# Patient Record
Sex: FEMALE | Race: OTHER | NOT HISPANIC OR LATINO
[De-identification: names, ages, dates, MRNs, and addresses within clinical notes are randomized per-mention and may not be internally consistent; named-entity substitution may affect disease eponyms.]

---

## 2017-06-14 ENCOUNTER — TRANSCRIPTION ENCOUNTER (OUTPATIENT)
Age: 42
End: 2017-06-14

## 2017-11-06 ENCOUNTER — TRANSCRIPTION ENCOUNTER (OUTPATIENT)
Age: 42
End: 2017-11-06

## 2018-05-01 ENCOUNTER — TRANSCRIPTION ENCOUNTER (OUTPATIENT)
Age: 43
End: 2018-05-01

## 2018-09-21 ENCOUNTER — TRANSCRIPTION ENCOUNTER (OUTPATIENT)
Age: 43
End: 2018-09-21

## 2018-10-02 ENCOUNTER — APPOINTMENT (OUTPATIENT)
Dept: OBGYN | Facility: CLINIC | Age: 43
End: 2018-10-02

## 2018-12-28 ENCOUNTER — RESULT REVIEW (OUTPATIENT)
Age: 43
End: 2018-12-28

## 2019-05-21 ENCOUNTER — TRANSCRIPTION ENCOUNTER (OUTPATIENT)
Age: 44
End: 2019-05-21

## 2019-05-23 ENCOUNTER — APPOINTMENT (OUTPATIENT)
Dept: FAMILY MEDICINE | Facility: CLINIC | Age: 44
End: 2019-05-23

## 2019-05-24 ENCOUNTER — APPOINTMENT (OUTPATIENT)
Dept: FAMILY MEDICINE | Facility: CLINIC | Age: 44
End: 2019-05-24
Payer: COMMERCIAL

## 2019-05-24 VITALS — TEMPERATURE: 97.7 F

## 2019-05-24 VITALS — DIASTOLIC BLOOD PRESSURE: 70 MMHG | SYSTOLIC BLOOD PRESSURE: 110 MMHG

## 2019-05-24 DIAGNOSIS — Z87.09 PERSONAL HISTORY OF OTHER DISEASES OF THE RESPIRATORY SYSTEM: ICD-10-CM

## 2019-05-24 DIAGNOSIS — R05 COUGH: ICD-10-CM

## 2019-05-24 PROCEDURE — 99202 OFFICE O/P NEW SF 15 MIN: CPT

## 2019-05-24 NOTE — REVIEW OF SYSTEMS
[Nasal Discharge] : nasal discharge [Sore Throat] : sore throat [Postnasal Drip] : postnasal drip [Headache] : headache [Cough] : cough [Negative] : Heme/Lymph [Fever] : no fever [Chills] : no chills [Chest Pain] : no chest pain [Hoarseness] : no hoarseness [Earache] : no earache [Palpitations] : no palpitations [Wheezing] : no wheezing [Shortness Of Breath] : no shortness of breath

## 2019-05-24 NOTE — HISTORY OF PRESENT ILLNESS
[FreeTextEntry8] : 43y/o with no significant PMHx presents to the office with c/o congestion and cough, with yellow PND. Additionally, c/o   sinus HA. H/o of sinus infections twice yearly. 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 No known seasonal allergies. \par \par \par \par

## 2019-05-24 NOTE — PHYSICAL EXAM
[Normal] : normal rate, regular rhythm, normal S1/S2, no murmur [de-identified] : clear watery PND  [de-identified] : AA&Ox3

## 2019-05-24 NOTE — PLAN
[FreeTextEntry1] : URI symptoms / Sinusitis\par  \par - Augmentin BID x5-7 days \par - Recommended Sudafed \par - Netti pot\par - Continue Flonase \par - Put head over hot pot\par - Steam up shower \par \par \par

## 2019-05-24 NOTE — ADDENDUM
[FreeTextEntry1] : I, Coltonmiguel Garciacandie, acted solely as a scribe for Dr. Daily on this date 05/24/2019.\par \par All medical record entries made by the Scribe were at my, Dr. Daily, direction and personally dictated by me on 05/24/2019. I have reviewed the chart and agree that the record accurately reflects my personal performance of the history, physical exam, assessment and plan.  I have also personally directed, reviewed and agreed with the chart.

## 2019-10-21 ENCOUNTER — TRANSCRIPTION ENCOUNTER (OUTPATIENT)
Age: 44
End: 2019-10-21

## 2021-01-26 ENCOUNTER — RESULT REVIEW (OUTPATIENT)
Age: 46
End: 2021-01-26

## 2021-06-24 ENCOUNTER — TRANSCRIPTION ENCOUNTER (OUTPATIENT)
Age: 46
End: 2021-06-24

## 2022-02-08 ENCOUNTER — RESULT REVIEW (OUTPATIENT)
Age: 47
End: 2022-02-08

## 2022-03-03 ENCOUNTER — NON-APPOINTMENT (OUTPATIENT)
Age: 47
End: 2022-03-03

## 2022-03-03 ENCOUNTER — APPOINTMENT (OUTPATIENT)
Dept: FAMILY MEDICINE | Facility: CLINIC | Age: 47
End: 2022-03-03
Payer: COMMERCIAL

## 2022-03-03 VITALS — SYSTOLIC BLOOD PRESSURE: 160 MMHG | DIASTOLIC BLOOD PRESSURE: 110 MMHG

## 2022-03-03 VITALS — TEMPERATURE: 96.9 F | DIASTOLIC BLOOD PRESSURE: 100 MMHG | SYSTOLIC BLOOD PRESSURE: 148 MMHG

## 2022-03-03 DIAGNOSIS — R00.2 PALPITATIONS: ICD-10-CM

## 2022-03-03 PROCEDURE — 93000 ELECTROCARDIOGRAM COMPLETE: CPT

## 2022-03-03 PROCEDURE — 99214 OFFICE O/P EST MOD 30 MIN: CPT | Mod: 25

## 2022-03-03 RX ORDER — VALSARTAN 80 MG/1
80 TABLET, COATED ORAL
Qty: 30 | Refills: 5 | Status: ACTIVE | COMMUNITY
Start: 2022-03-03 | End: 1900-01-01

## 2022-03-03 NOTE — HISTORY OF PRESENT ILLNESS
[FreeTextEntry8] : 48 y/o F without significant PMHx presents to office for BP check.Pt was at OBGYN and BP elevated 140/110. Has HA no dizziness. Never had HTN previously. No FH . Recently noticed heart "fluttering" and some palpitations.Does admit to increased stress and anxiety for thepast 6 months (daughter has GI Issues)

## 2022-03-03 NOTE — REVIEW OF SYSTEMS
[Chest Pain] : chest pain [Palpitations] : palpitations [Anxiety] : anxiety [Negative] : Respiratory

## 2022-03-24 ENCOUNTER — APPOINTMENT (OUTPATIENT)
Dept: FAMILY MEDICINE | Facility: CLINIC | Age: 47
End: 2022-03-24
Payer: COMMERCIAL

## 2022-03-24 VITALS — SYSTOLIC BLOOD PRESSURE: 126 MMHG | DIASTOLIC BLOOD PRESSURE: 82 MMHG | TEMPERATURE: 96.6 F

## 2022-03-24 DIAGNOSIS — Z72.89 OTHER PROBLEMS RELATED TO LIFESTYLE: ICD-10-CM

## 2022-03-24 PROCEDURE — 99213 OFFICE O/P EST LOW 20 MIN: CPT

## 2022-03-24 NOTE — HISTORY OF PRESENT ILLNESS
[de-identified] : 48 y/o F  PMHx HTN (Valsartan) presents to office for BP check. Started Valsartan 3 weeks ago. Feels well. Also stopped drinking wine 1 wek ago. Admits to 1-2 glasses nightly

## 2022-05-02 ENCOUNTER — APPOINTMENT (OUTPATIENT)
Dept: FAMILY MEDICINE | Facility: CLINIC | Age: 47
End: 2022-05-02
Payer: COMMERCIAL

## 2022-05-02 VITALS — WEIGHT: 112 LBS

## 2022-05-02 VITALS — SYSTOLIC BLOOD PRESSURE: 122 MMHG | TEMPERATURE: 94.9 F | DIASTOLIC BLOOD PRESSURE: 80 MMHG

## 2022-05-02 DIAGNOSIS — R63.5 ABNORMAL WEIGHT GAIN: ICD-10-CM

## 2022-05-02 DIAGNOSIS — Z00.00 ENCOUNTER FOR GENERAL ADULT MEDICAL EXAMINATION W/OUT ABNORMAL FINDINGS: ICD-10-CM

## 2022-05-02 PROCEDURE — 99214 OFFICE O/P EST MOD 30 MIN: CPT

## 2022-05-02 NOTE — REVIEW OF SYSTEMS
[Recent Change In Weight] : ~T recent weight change [Anxiety] : anxiety [Depression] : depression [Negative] : Respiratory

## 2022-05-02 NOTE — HISTORY OF PRESENT ILLNESS
[FreeTextEntry8] : 46 y/o F  PMHx HTN (Valsartan) presents to office for increased fatigue and weight gain. Stopped BCP few months ago,. Gained 8 pounds in few 2 months.  Recent labs per GYN told patient shes "in perimenopause".  Admits to drinking less wine nightly (not every night).Menses irregular off  BCP. Takes CBD for insomnia. \par Feels exhausted all day". Feels a lot of anxiety with family issues. Feels overwhelemed

## 2022-05-17 ENCOUNTER — APPOINTMENT (OUTPATIENT)
Dept: FAMILY MEDICINE | Facility: CLINIC | Age: 47
End: 2022-05-17
Payer: COMMERCIAL

## 2022-05-17 DIAGNOSIS — I10 ESSENTIAL (PRIMARY) HYPERTENSION: ICD-10-CM

## 2022-05-17 PROCEDURE — 99213 OFFICE O/P EST LOW 20 MIN: CPT | Mod: 95

## 2022-05-17 NOTE — HISTORY OF PRESENT ILLNESS
[Home] : at home, [unfilled] , at the time of the visit. [Medical Office: ([facility])___] : at the medical office located in  [Verbal consent obtained from patient] : the patient, [unfilled] [de-identified] : 48 y/o F  PMHx HTN (Valsartan) presents via telehealth for lab review on update.Pt stai;l feeling very fatigued.

## 2022-05-27 ENCOUNTER — NON-APPOINTMENT (OUTPATIENT)
Age: 47
End: 2022-05-27

## 2022-10-07 ENCOUNTER — NON-APPOINTMENT (OUTPATIENT)
Age: 47
End: 2022-10-07

## 2023-02-03 ENCOUNTER — NON-APPOINTMENT (OUTPATIENT)
Age: 48
End: 2023-02-03

## 2023-03-20 ENCOUNTER — NON-APPOINTMENT (OUTPATIENT)
Age: 48
End: 2023-03-20

## 2023-03-22 ENCOUNTER — NON-APPOINTMENT (OUTPATIENT)
Age: 48
End: 2023-03-22

## 2023-04-01 ENCOUNTER — NON-APPOINTMENT (OUTPATIENT)
Age: 48
End: 2023-04-01

## 2023-06-06 ENCOUNTER — APPOINTMENT (OUTPATIENT)
Dept: FAMILY MEDICINE | Facility: CLINIC | Age: 48
End: 2023-06-06
Payer: COMMERCIAL

## 2023-06-06 VITALS — SYSTOLIC BLOOD PRESSURE: 138 MMHG | DIASTOLIC BLOOD PRESSURE: 82 MMHG

## 2023-06-06 VITALS
RESPIRATION RATE: 16 BRPM | OXYGEN SATURATION: 96 % | DIASTOLIC BLOOD PRESSURE: 80 MMHG | SYSTOLIC BLOOD PRESSURE: 140 MMHG | HEART RATE: 87 BPM | TEMPERATURE: 96.8 F

## 2023-06-06 DIAGNOSIS — N95.1 MENOPAUSAL AND FEMALE CLIMACTERIC STATES: ICD-10-CM

## 2023-06-06 DIAGNOSIS — R53.83 OTHER FATIGUE: ICD-10-CM

## 2023-06-06 DIAGNOSIS — E83.19 OTHER DISORDERS OF IRON METABOLISM: ICD-10-CM

## 2023-06-06 PROCEDURE — 99215 OFFICE O/P EST HI 40 MIN: CPT

## 2023-06-06 NOTE — HISTORY OF PRESENT ILLNESS
[FreeTextEntry8] : 47 y/o F presents with elevated BP> Pt had elevated BP last year GYN stopped BCP. BP got better. Restarted BCP 6 weeks ago BP elevated. Stopped BCP last week however still elevated. BP readings 148/98, 149/118,133/92.  Father +. Had some lightheadedness. Ha tyshawn Covid 4 weeks ago. Stopped  Caffeine and wine.HTN . Under stress at home feels anxious. Doesnt feel depressed.

## 2023-06-06 NOTE — REVIEW OF SYSTEMS
[Fatigue] : fatigue [Negative] : Cardiovascular [Dizziness] : dizziness [Anxiety] : anxiety [FreeTextEntry6] : heavy breathing [de-identified] : lightheaded intremittently

## 2023-07-02 LAB
CHOLEST SERPL-MCNC: 251 MG/DL
FERRITIN SERPL-MCNC: 27 NG/ML
HDLC SERPL-MCNC: 78 MG/DL
IRON SERPL-MCNC: 113 UG/DL
LDLC SERPL CALC-MCNC: 141 MG/DL
NONHDLC SERPL-MCNC: 173 MG/DL
TRIGL SERPL-MCNC: 162 MG/DL
TSH SERPL-ACNC: 2.28 UIU/ML

## 2023-07-06 ENCOUNTER — APPOINTMENT (OUTPATIENT)
Dept: FAMILY MEDICINE | Facility: CLINIC | Age: 48
End: 2023-07-06
Payer: COMMERCIAL

## 2023-07-06 VITALS
SYSTOLIC BLOOD PRESSURE: 125 MMHG | DIASTOLIC BLOOD PRESSURE: 85 MMHG | OXYGEN SATURATION: 97 % | TEMPERATURE: 97.5 F | RESPIRATION RATE: 16 BRPM | HEART RATE: 82 BPM

## 2023-07-06 DIAGNOSIS — F41.9 ANXIETY DISORDER, UNSPECIFIED: ICD-10-CM

## 2023-07-06 DIAGNOSIS — R03.0 ELEVATED BLOOD-PRESSURE READING, W/OUT DIAGNOSIS OF HYPERTENSION: ICD-10-CM

## 2023-07-06 PROCEDURE — 99214 OFFICE O/P EST MOD 30 MIN: CPT

## 2023-07-06 NOTE — HISTORY OF PRESENT ILLNESS
[de-identified] : 47 y/o F presents with BP readings.  121/80,153/98,144/102. Pt states that when her anxiety is worse her BP is elevated. Here to review l abs

## 2023-10-16 ENCOUNTER — NON-APPOINTMENT (OUTPATIENT)
Age: 48
End: 2023-10-16

## 2023-10-24 ENCOUNTER — RESULT REVIEW (OUTPATIENT)
Age: 48
End: 2023-10-24

## 2023-10-24 ENCOUNTER — APPOINTMENT (OUTPATIENT)
Dept: RADIOLOGY | Facility: IMAGING CENTER | Age: 48
End: 2023-10-24
Payer: COMMERCIAL

## 2023-10-24 ENCOUNTER — OUTPATIENT (OUTPATIENT)
Dept: OUTPATIENT SERVICES | Facility: HOSPITAL | Age: 48
LOS: 1 days | End: 2023-10-24
Payer: COMMERCIAL

## 2023-10-24 ENCOUNTER — APPOINTMENT (OUTPATIENT)
Dept: FAMILY MEDICINE | Facility: CLINIC | Age: 48
End: 2023-10-24
Payer: COMMERCIAL

## 2023-10-24 VITALS
HEART RATE: 98 BPM | OXYGEN SATURATION: 99 % | DIASTOLIC BLOOD PRESSURE: 80 MMHG | TEMPERATURE: 97.5 F | RESPIRATION RATE: 16 BRPM | SYSTOLIC BLOOD PRESSURE: 120 MMHG

## 2023-10-24 DIAGNOSIS — M54.6 PAIN IN THORACIC SPINE: ICD-10-CM

## 2023-10-24 DIAGNOSIS — G89.29 LOW BACK PAIN, UNSPECIFIED: ICD-10-CM

## 2023-10-24 DIAGNOSIS — M54.50 LOW BACK PAIN, UNSPECIFIED: ICD-10-CM

## 2023-10-24 PROCEDURE — 99214 OFFICE O/P EST MOD 30 MIN: CPT

## 2023-10-24 PROCEDURE — 72072 X-RAY EXAM THORAC SPINE 3VWS: CPT | Mod: 26

## 2023-10-24 PROCEDURE — 72100 X-RAY EXAM L-S SPINE 2/3 VWS: CPT

## 2023-10-24 PROCEDURE — 72072 X-RAY EXAM THORAC SPINE 3VWS: CPT

## 2023-10-24 PROCEDURE — 72100 X-RAY EXAM L-S SPINE 2/3 VWS: CPT | Mod: 26

## 2023-10-24 RX ORDER — METHYLPREDNISOLONE 4 MG/1
4 TABLET ORAL
Qty: 1 | Refills: 0 | Status: ACTIVE | COMMUNITY
Start: 2023-10-24 | End: 1900-01-01

## 2023-10-24 RX ORDER — NAPROXEN 500 MG/1
500 TABLET ORAL
Qty: 20 | Refills: 0 | Status: ACTIVE | COMMUNITY
Start: 2023-10-17

## 2023-10-24 RX ORDER — AMOXICILLIN AND CLAVULANATE POTASSIUM 875; 125 MG/1; MG/1
875-125 TABLET, COATED ORAL TWICE DAILY
Qty: 14 | Refills: 0 | Status: COMPLETED | COMMUNITY
Start: 2019-05-24 | End: 2023-10-24

## 2023-10-24 RX ORDER — CYCLOBENZAPRINE HYDROCHLORIDE 10 MG/1
10 TABLET, FILM COATED ORAL
Qty: 21 | Refills: 0 | Status: ACTIVE | COMMUNITY
Start: 2023-10-17

## 2023-11-02 DIAGNOSIS — E78.5 HYPERLIPIDEMIA, UNSPECIFIED: ICD-10-CM

## 2023-11-06 ENCOUNTER — NON-APPOINTMENT (OUTPATIENT)
Age: 48
End: 2023-11-06

## 2023-11-06 ENCOUNTER — APPOINTMENT (OUTPATIENT)
Dept: PHYSICAL MEDICINE AND REHAB | Facility: CLINIC | Age: 48
End: 2023-11-06
Payer: COMMERCIAL

## 2023-11-06 VITALS — DIASTOLIC BLOOD PRESSURE: 101 MMHG | OXYGEN SATURATION: 98 % | SYSTOLIC BLOOD PRESSURE: 158 MMHG | HEART RATE: 108 BPM

## 2023-11-06 DIAGNOSIS — G89.29 MYALGIA, OTHER SITE: ICD-10-CM

## 2023-11-06 DIAGNOSIS — M79.18 MYALGIA, OTHER SITE: ICD-10-CM

## 2023-11-06 PROCEDURE — 99204 OFFICE O/P NEW MOD 45 MIN: CPT

## 2023-11-06 RX ORDER — TIZANIDINE 2 MG/1
2 TABLET ORAL
Qty: 45 | Refills: 1 | Status: ACTIVE | COMMUNITY
Start: 2023-11-06 | End: 1900-01-01

## 2023-11-06 RX ORDER — MELOXICAM 15 MG/1
15 TABLET ORAL
Qty: 30 | Refills: 1 | Status: ACTIVE | COMMUNITY
Start: 2023-11-06 | End: 1900-01-01

## 2023-11-14 ENCOUNTER — NON-APPOINTMENT (OUTPATIENT)
Age: 48
End: 2023-11-14

## 2023-11-24 ENCOUNTER — APPOINTMENT (OUTPATIENT)
Dept: MRI IMAGING | Facility: CLINIC | Age: 48
End: 2023-11-24
Payer: COMMERCIAL

## 2023-11-24 ENCOUNTER — OUTPATIENT (OUTPATIENT)
Dept: OUTPATIENT SERVICES | Facility: HOSPITAL | Age: 48
LOS: 1 days | End: 2023-11-24
Payer: COMMERCIAL

## 2023-11-24 DIAGNOSIS — M54.16 RADICULOPATHY, LUMBAR REGION: ICD-10-CM

## 2023-11-24 DIAGNOSIS — M79.18 MYALGIA, OTHER SITE: ICD-10-CM

## 2023-11-24 PROCEDURE — 72148 MRI LUMBAR SPINE W/O DYE: CPT

## 2023-11-24 PROCEDURE — 72148 MRI LUMBAR SPINE W/O DYE: CPT | Mod: 26

## 2023-12-11 ENCOUNTER — APPOINTMENT (OUTPATIENT)
Dept: PHYSICAL MEDICINE AND REHAB | Facility: CLINIC | Age: 48
End: 2023-12-11
Payer: COMMERCIAL

## 2023-12-11 DIAGNOSIS — M54.16 RADICULOPATHY, LUMBAR REGION: ICD-10-CM

## 2023-12-11 PROCEDURE — 99213 OFFICE O/P EST LOW 20 MIN: CPT

## 2023-12-12 ENCOUNTER — NON-APPOINTMENT (OUTPATIENT)
Age: 48
End: 2023-12-12

## 2023-12-22 LAB
CHOLEST SERPL-MCNC: 275 MG/DL
HDLC SERPL-MCNC: 47 MG/DL
LDLC SERPL CALC-MCNC: 119 MG/DL
NONHDLC SERPL-MCNC: 227 MG/DL
TRIGL SERPL-MCNC: 607 MG/DL

## 2024-03-20 ENCOUNTER — NON-APPOINTMENT (OUTPATIENT)
Age: 49
End: 2024-03-20

## 2025-02-07 ENCOUNTER — NON-APPOINTMENT (OUTPATIENT)
Age: 50
End: 2025-02-07

## 2025-05-10 ENCOUNTER — NON-APPOINTMENT (OUTPATIENT)
Age: 50
End: 2025-05-10